# Patient Record
Sex: FEMALE | Race: WHITE | NOT HISPANIC OR LATINO | Employment: FULL TIME | URBAN - METROPOLITAN AREA
[De-identification: names, ages, dates, MRNs, and addresses within clinical notes are randomized per-mention and may not be internally consistent; named-entity substitution may affect disease eponyms.]

---

## 2023-03-23 PROBLEM — L40.9 SCALP PSORIASIS: Status: ACTIVE | Noted: 2023-03-23

## 2023-03-23 PROBLEM — L91.8 SKIN TAG: Status: ACTIVE | Noted: 2023-03-23

## 2023-03-23 PROBLEM — J18.9 PNEUMONIA: Status: ACTIVE | Noted: 2023-03-23

## 2023-03-23 PROBLEM — K52.9 GASTROENTERITIS: Status: ACTIVE | Noted: 2023-03-23

## 2023-03-23 PROBLEM — U07.1 COVID-19: Status: ACTIVE | Noted: 2023-03-23

## 2023-03-23 PROBLEM — S16.1XXA CERVICAL STRAIN: Status: ACTIVE | Noted: 2023-03-23

## 2023-03-23 PROBLEM — S39.012A LUMBAR STRAIN, INITIAL ENCOUNTER: Status: ACTIVE | Noted: 2023-03-23
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
## 2023-03-23 PROBLEM — R05.9 COUGH: Status: ACTIVE | Noted: 2023-03-23

## 2023-03-23 PROBLEM — L30.9 DERMATITIS: Status: ACTIVE | Noted: 2023-03-23

## 2023-03-23 PROBLEM — L70.9 ACNE: Status: ACTIVE | Noted: 2023-03-23

## 2023-03-23 RX ORDER — CLOBETASOL PROPIONATE 0.46 MG/ML
SOLUTION TOPICAL DAILY
COMMUNITY
Start: 2021-10-04

## 2023-03-23 RX ORDER — LORATADINE 10 MG/1
CAPSULE, LIQUID FILLED ORAL
COMMUNITY

## 2023-03-23 RX ORDER — TRIAMCINOLONE ACETONIDE 1 MG/G
CREAM TOPICAL 2 TIMES DAILY
COMMUNITY
Start: 2020-05-29 | End: 2023-03-28

## 2023-03-28 ENCOUNTER — OFFICE VISIT (OUTPATIENT)
Dept: PRIMARY CARE | Facility: CLINIC | Age: 43
End: 2023-03-28
Payer: COMMERCIAL

## 2023-03-28 VITALS
DIASTOLIC BLOOD PRESSURE: 80 MMHG | WEIGHT: 224 LBS | TEMPERATURE: 98.4 F | SYSTOLIC BLOOD PRESSURE: 122 MMHG | BODY MASS INDEX: 37.28 KG/M2

## 2023-03-28 DIAGNOSIS — M54.42 ACUTE LEFT-SIDED LOW BACK PAIN WITH LEFT-SIDED SCIATICA: Primary | ICD-10-CM

## 2023-03-28 DIAGNOSIS — L40.9 SCALP PSORIASIS: ICD-10-CM

## 2023-03-28 PROCEDURE — 99213 OFFICE O/P EST LOW 20 MIN: CPT | Performed by: FAMILY MEDICINE

## 2023-03-28 PROCEDURE — 1036F TOBACCO NON-USER: CPT | Performed by: FAMILY MEDICINE

## 2023-03-28 RX ORDER — SULINDAC 200 MG/1
200 TABLET ORAL
Qty: 60 TABLET | Refills: 0 | Status: SHIPPED | OUTPATIENT
Start: 2023-03-28 | End: 2023-05-27

## 2023-03-28 RX ORDER — TRIAMCINOLONE ACETONIDE 1 MG/G
CREAM TOPICAL 2 TIMES DAILY PRN
Qty: 80 G | Refills: 0 | Status: SHIPPED | OUTPATIENT
Start: 2023-03-28

## 2023-03-28 RX ORDER — CLOBETASOL PROPIONATE 0.46 MG/ML
SOLUTION TOPICAL DAILY
Status: CANCELLED | OUTPATIENT
Start: 2023-03-28

## 2023-03-28 ASSESSMENT — PAIN SCALES - GENERAL: PAINLEVEL: 8

## 2023-03-28 NOTE — PROGRESS NOTES
Subjective   Patient ID: Emily Castelan is a 42 y.o. female who presents for Back Pain (Present for back pain and now states pain in calf of Left leg ) and Leg Pain.  HPI  Back pain radiating down leg to calf  Back pain started about 1 month ago    Ibuprofen 400 mg 2-3 x day + Aleve one tab daily    Driving seems to make it worse    Objective   Visit Vitals  /80   Temp 36.9 °C (98.4 °F) (Oral)      Physical Exam    Assessment/Plan   Diagnoses and all orders for this visit:  Acute left-sided low back pain with left-sided sciatica  -     sulindac (Clinoril) 200 mg tablet; Take 1 tablet (200 mg) by mouth in the morning and 1 tablet (200 mg) in the evening. Take with meals.  Scalp psoriasis  -     triamcinolone (Kenalog) 0.1 % cream; Apply topically 2 times a day as needed for irritation. APPLY SPARINGLY AND MASSAGE IN      Referral to [facility] for PT; to call if continued issues     Aliyah Jules MD  Family Medicine   [facility]